# Patient Record
Sex: FEMALE
[De-identification: names, ages, dates, MRNs, and addresses within clinical notes are randomized per-mention and may not be internally consistent; named-entity substitution may affect disease eponyms.]

---

## 2024-05-02 ENCOUNTER — APPOINTMENT (OUTPATIENT)
Dept: BARIATRICS | Facility: CLINIC | Age: 44
End: 2024-05-02
Payer: MEDICAID

## 2024-05-02 VITALS
WEIGHT: 168 LBS | TEMPERATURE: 97.5 F | HEART RATE: 83 BPM | HEIGHT: 58 IN | BODY MASS INDEX: 35.26 KG/M2 | OXYGEN SATURATION: 97 % | SYSTOLIC BLOOD PRESSURE: 138 MMHG | DIASTOLIC BLOOD PRESSURE: 86 MMHG

## 2024-05-02 DIAGNOSIS — Z00.00 ENCOUNTER FOR GENERAL ADULT MEDICAL EXAMINATION W/OUT ABNORMAL FINDINGS: ICD-10-CM

## 2024-05-02 PROCEDURE — 99204 OFFICE O/P NEW MOD 45 MIN: CPT

## 2024-05-09 LAB
25(OH)D3 SERPL-MCNC: 24.5 NG/ML
A-TOCOPHEROL VIT E SERPL-MCNC: 11 MG/L
ALBUMIN SERPL ELPH-MCNC: 4.4 G/DL
ALP BLD-CCNC: 142 U/L
ALT SERPL-CCNC: 82 U/L
ANION GAP SERPL CALC-SCNC: 12 MMOL/L
APPEARANCE: CLEAR
AST SERPL-CCNC: 44 U/L
BACTERIA: ABNORMAL /HPF
BASOPHILS # BLD AUTO: 0.08 K/UL
BASOPHILS NFR BLD AUTO: 1.1 %
BETA+GAMMA TOCOPHEROL SERPL-MCNC: 1.6 MG/L
BILIRUB SERPL-MCNC: 0.6 MG/DL
BILIRUBIN URINE: NEGATIVE
BLOOD URINE: NEGATIVE
BUN SERPL-MCNC: 13 MG/DL
CA-I SERPL-SCNC: 5.1 MG/DL
CALCIUM SERPL-MCNC: 9.4 MG/DL
CALCIUM SERPL-MCNC: 9.4 MG/DL
CAST: 0 /LPF
CHLORIDE SERPL-SCNC: 103 MMOL/L
CHOLEST SERPL-MCNC: 240 MG/DL
CO2 SERPL-SCNC: 24 MMOL/L
COLOR: YELLOW
CREAT SERPL-MCNC: 0.65 MG/DL
EGFR: 113 ML/MIN/1.73M2
EOSINOPHIL # BLD AUTO: 0.39 K/UL
EOSINOPHIL NFR BLD AUTO: 5.5 %
EPITHELIAL CELLS: 5 /HPF
ESTIMATED AVERAGE GLUCOSE: 126 MG/DL
FOLATE SERPL-MCNC: 14 NG/ML
GLUCOSE QUALITATIVE U: NEGATIVE MG/DL
GLUCOSE SERPL-MCNC: 103 MG/DL
HBA1C MFR BLD HPLC: 6 %
HCG SERPL QL: NEGATIVE
HCT VFR BLD CALC: 40.9 %
HDLC SERPL-MCNC: 39 MG/DL
HGB BLD-MCNC: 13 G/DL
IMM GRANULOCYTES NFR BLD AUTO: 0.1 %
INR PPP: 0.93 RATIO
IRON SATN MFR SERPL: 23 %
IRON SERPL-MCNC: 101 UG/DL
KETONES URINE: NEGATIVE MG/DL
LDLC SERPL CALC-MCNC: 158 MG/DL
LEUKOCYTE ESTERASE URINE: NEGATIVE
LYMPHOCYTES # BLD AUTO: 1.7 K/UL
LYMPHOCYTES NFR BLD AUTO: 23.8 %
MAN DIFF?: NORMAL
MCHC RBC-ENTMCNC: 25.9 PG
MCHC RBC-ENTMCNC: 31.8 GM/DL
MCV RBC AUTO: 81.5 FL
MICROSCOPIC-UA: NORMAL
MONOCYTES # BLD AUTO: 0.42 K/UL
MONOCYTES NFR BLD AUTO: 5.9 %
NEUTROPHILS # BLD AUTO: 4.54 K/UL
NEUTROPHILS NFR BLD AUTO: 63.6 %
NITRITE URINE: NEGATIVE
NONHDLC SERPL-MCNC: 201 MG/DL
PAPP-A SERPL-ACNC: <1 MIU/ML
PARATHYROID HORMONE INTACT: 46 PG/ML
PH URINE: 6
PLATELET # BLD AUTO: 249 K/UL
POTASSIUM SERPL-SCNC: 4.5 MMOL/L
PREALB SERPL NEPH-MCNC: 25 MG/DL
PROT SERPL-MCNC: 6.9 G/DL
PROTEIN URINE: NEGATIVE MG/DL
PT BLD: 10.6 SEC
RBC # BLD: 5.02 M/UL
RBC # FLD: 15.8 %
RED BLOOD CELLS URINE: 0 /HPF
SODIUM SERPL-SCNC: 140 MMOL/L
SPECIFIC GRAVITY URINE: 1.02
TIBC SERPL-MCNC: 433 UG/DL
TRIGL SERPL-MCNC: 234 MG/DL
TSH SERPL-ACNC: 1.22 UIU/ML
UIBC SERPL-MCNC: 332 UG/DL
UREA BREATH TEST QL: NEGATIVE
UROBILINOGEN URINE: 0.2 MG/DL
VIT A SERPL-MCNC: 44.8 UG/DL
VIT B12 SERPL-MCNC: 199 PG/ML
WBC # FLD AUTO: 7.14 K/UL
WHITE BLOOD CELLS URINE: 1 /HPF
ZINC SERPL-MCNC: 86 UG/DL

## 2024-05-09 RX ORDER — ERGOCALCIFEROL 1.25 MG/1
1.25 MG CAPSULE, LIQUID FILLED ORAL
Qty: 8 | Refills: 0 | Status: ACTIVE | COMMUNITY
Start: 2024-05-09 | End: 1900-01-01

## 2024-05-09 RX ORDER — CYANOCOBALAMIN (VITAMIN B-12) 2500 MCG
2500 TABLET, SUBLINGUAL SUBLINGUAL DAILY
Qty: 30 | Refills: 1 | Status: ACTIVE | COMMUNITY
Start: 2024-05-09 | End: 1900-01-01

## 2024-05-10 ENCOUNTER — APPOINTMENT (OUTPATIENT)
Dept: BARIATRICS | Facility: CLINIC | Age: 44
End: 2024-05-10
Payer: MEDICAID

## 2024-05-10 VITALS — BODY MASS INDEX: 35.68 KG/M2 | HEIGHT: 58 IN | WEIGHT: 170 LBS

## 2024-05-10 PROCEDURE — 98968 PH1 ASSMT&MGMT NQHP 21-30: CPT

## 2024-05-13 LAB — VIT B1 SERPL-MCNC: 129.1 NMOL/L

## 2024-05-23 NOTE — END OF VISIT
[Time Spent: ___ minutes] : I have spent [unfilled] minutes of time on the encounter. [FreeTextEntry3] :  All medical record entries made by the Scribe were at my, CHENTE Rosales , direction and personally dictated by me on 05/02/2024 . I have reviewed the chart and agree that the record accurately reflects my personal performance of the history, physical exam, assessment and plan. I have also personally directed, reviewed, and agreed with the chart.

## 2024-05-23 NOTE — HISTORY OF PRESENT ILLNESS
[de-identified] : Pt is a 42 y/o F with PMHx of ARAGON and PSHx of partial lobectomy with sentinel lymph node dissection for breast CA who presents today after beginning bariatric workup in [facility] for an initial consultation for bariatric surgery. Patient states that she is done with breast CA treatment and is currently in remission. We discussed at length surgical and non-surgical options and that non-surgical approaches are unlikely to lead to long term, sustained weight loss. We also discussed that surgery alone is unlikely to be successful but should rather be seen as a tool for weight loss to be integrated with physical activity and nutritional counseling. All risks of surgery were explained to the patient including the risks of leaks, infection, blood clots, and death. The patient verbalized understanding and agreed to proceed with the evaluation. Will continue bariatric workup and move forward with possible VSG.

## 2024-05-23 NOTE — ASSESSMENT
[FreeTextEntry1] : Pt is a 42 y/o F with PMHx of ARAGON and PSHx of partial lobectomy with sentinel lymph node dissection for breast CA who presents today after beginning bariatric workup in [facility] for an initial consultation for bariatric surgery. Patient states that she is done with breast CA treatment and is currently in remission. We discussed at length surgical and non-surgical options and that non-surgical approaches are unlikely to lead to long term, sustained weight loss. We also discussed that surgery alone is unlikely to be successful but should rather be seen as a tool for weight loss to be integrated with physical activity and nutritional counseling. All risks of surgery were explained to the patient including the risks of leaks, infection, blood clots, and death. The patient verbalized understanding and agreed to proceed with the evaluation. Will continue bariatric workup and move forward with possible VSG.

## 2024-05-23 NOTE — ADDENDUM
[FreeTextEntry1] :  Documented by Garry Zamarripa acting as a scribe for CHENTE Li  on 05/02/2024  .

## 2024-05-23 NOTE — PLAN
[FreeTextEntry1] : Continue bariatric workup and move forward with possible VSG.  Normal rate, regular rhythm, normal S1, S2 heart sounds heard.

## 2024-06-10 ENCOUNTER — APPOINTMENT (OUTPATIENT)
Dept: BARIATRICS | Facility: CLINIC | Age: 44
End: 2024-06-10
Payer: MEDICAID

## 2024-06-10 VITALS — BODY MASS INDEX: 35.68 KG/M2 | HEIGHT: 58 IN | WEIGHT: 170 LBS

## 2024-06-10 PROCEDURE — 98967 PH1 ASSMT&MGMT NQHP 11-20: CPT

## 2024-06-17 ENCOUNTER — APPOINTMENT (OUTPATIENT)
Dept: PULMONOLOGY | Facility: CLINIC | Age: 44
End: 2024-06-17
Payer: MEDICAID

## 2024-06-17 ENCOUNTER — APPOINTMENT (OUTPATIENT)
Dept: PULMONOLOGY | Facility: CLINIC | Age: 44
End: 2024-06-17

## 2024-06-17 VITALS
TEMPERATURE: 98.7 F | DIASTOLIC BLOOD PRESSURE: 86 MMHG | SYSTOLIC BLOOD PRESSURE: 132 MMHG | HEIGHT: 58 IN | WEIGHT: 171.13 LBS | RESPIRATION RATE: 18 BRPM | BODY MASS INDEX: 35.92 KG/M2 | HEART RATE: 88 BPM

## 2024-06-17 DIAGNOSIS — E78.00 PURE HYPERCHOLESTEROLEMIA, UNSPECIFIED: ICD-10-CM

## 2024-06-17 DIAGNOSIS — Z83.49 FAMILY HISTORY OF OTHER ENDOCRINE, NUTRITIONAL AND METABOLIC DISEASES: ICD-10-CM

## 2024-06-17 DIAGNOSIS — E66.01 MORBID (SEVERE) OBESITY DUE TO EXCESS CALORIES: ICD-10-CM

## 2024-06-17 DIAGNOSIS — C50.919 MALIGNANT NEOPLASM OF UNSPECIFIED SITE OF UNSPECIFIED FEMALE BREAST: ICD-10-CM

## 2024-06-17 DIAGNOSIS — Z83.3 FAMILY HISTORY OF DIABETES MELLITUS: ICD-10-CM

## 2024-06-17 DIAGNOSIS — Z82.5 FAMILY HISTORY OF ASTHMA AND OTHER CHRONIC LOWER RESPIRATORY DISEASES: ICD-10-CM

## 2024-06-17 DIAGNOSIS — Z01.811 ENCOUNTER FOR PREPROCEDURAL RESPIRATORY EXAMINATION: ICD-10-CM

## 2024-06-17 DIAGNOSIS — Z78.9 OTHER SPECIFIED HEALTH STATUS: ICD-10-CM

## 2024-06-17 DIAGNOSIS — K76.0 FATTY (CHANGE OF) LIVER, NOT ELSEWHERE CLASSIFIED: ICD-10-CM

## 2024-06-17 DIAGNOSIS — Z82.49 FAMILY HISTORY OF ISCHEMIC HEART DISEASE AND OTHER DISEASES OF THE CIRCULATORY SYSTEM: ICD-10-CM

## 2024-06-17 PROCEDURE — 99203 OFFICE O/P NEW LOW 30 MIN: CPT

## 2024-06-17 RX ORDER — PHENOL 1.4 %
AEROSOL, SPRAY (ML) MUCOUS MEMBRANE
Refills: 0 | Status: DISCONTINUED | COMMUNITY

## 2024-06-17 RX ORDER — ROSUVASTATIN CALCIUM 20 MG/1
20 TABLET, FILM COATED ORAL DAILY
Refills: 0 | Status: ACTIVE | COMMUNITY

## 2024-06-17 RX ORDER — LETROZOLE TABLETS 2.5 MG/1
2.5 TABLET, FILM COATED ORAL DAILY
Refills: 0 | Status: ACTIVE | COMMUNITY

## 2024-06-20 NOTE — ASSESSMENT
[FreeTextEntry1] : Patient is a 43-year-old female with a medical history of Breast CA, HLD, and morbid obesity who presents for preoperative pulmonary evaluation in anticipation of undergoing bariatric surgery. The surgical date for a possible vertical sleeve gastrectomy (VSG) has yet to be finalized. Currently without any sinopulmonary complaint and denies any history of pulmonary disease. No pulmonary contradiction for bariatric surgery has been identified. Cleared from a pulmonary perspective for surgery.   # Preop pulmonary/respiratory exam - Pt denies any past or present history of pulmonary symptoms - STOP-BANG Total Score: 1 (Low risk of BHARATH Yes 0-2). - [place] Sleepiness Score: 3 (0-7: It is unlikely that you are abnormally sleepy.).   - Mallampati Class: I - Pulmonary clearance for bariatric surgery provided

## 2024-06-20 NOTE — HISTORY OF PRESENT ILLNESS
[Never] : never [TextBox_4] : Patient is a 43-year-old female with a medical history of Breast CA, HLD, and morbid obesity presenting for an initial visit following a referral from AUSTIN Alanis for pulmonary clearance in anticipation of bariatric surgery. The surgical date for a possible vertical sleeve gastrectomy (VSG) has yet to be finalized.  The patient denies having any history of pulmonary disease such as asthma nor does she complain of any present pulmonary symptoms. The presence of shortness of breath, wheezing, cough, and chest tightness was denied. It was mentioned the only time in which she may experience shortness of breath if moving at an extremely fast pace. She is able to walk rough 8 city blocks at an even pace without experiencing any shortness of breath. She denies any recent history of pulmonary infection and reports having contracted COVID during the initial phase of the pandemic during which time no pulmonary symptoms arose.   It was mentioned she has a previous history of having surgery requiring anesthesia approximately one year ago related to breast CA. There were no reported pulmonary complications at that time.   Dentition: Missing tooth: YES  Loose teeth: NO Dentures: NO     STOP-BANG Sleep Apnea Questionnaire Do you SNORE loudly (louder than talking or loud enough to be heard through closed doors)? NO Do you often feel TIRED, fatigued, or sleepy during daytime? NO Has anyone OBSERVED you stop breathing during your sleep? NO Do you have or are you being treated for high blood PRESSURE? NO BMI more than 35kg/m2? YES  AGE over 50 years old? NO NECK circumference > 16 inches (40cm)? NO GENDER: Male? NO                    Total Score: 1 - Low risk of BHARATH Yes 0-2      [place] Sleepiness Scale How likely are you to doze off or fall asleep in the following situations? 0-Never, 1-Slight, 2-Moderate, 3-High Sitting and reading ___2__ Watching TV __1___ Sitting, inactive in public place __0___ As a passenger in a car for an hour without a break _0___ Lying down to rest in the afternoon __0___ Sitting and talking to someone ___0__ Sitting quietly after lunch without alcohol __0___ In a car stopped for a few minutes in traffic __0___                     Score: 3 (0-7: It is unlikely that you are abnormally sleepy.)  [TextBox_29] : Denies history of alcohol consumption

## 2024-06-20 NOTE — PHYSICAL EXAM
[No Acute Distress] : no acute distress [Normal Oropharynx] : normal oropharynx [I] : Mallampati Class: I [2+] : Right Tonsil: 2+ [Normal Appearance] : normal appearance [No Neck Mass] : no neck mass [Normal Rate/Rhythm] : normal rate/rhythm [Normal S1, S2] : normal s1, s2 [No Resp Distress] : no resp distress [Clear to Auscultation Bilaterally] : clear to auscultation bilaterally [Normal Gait] : normal gait [No Clubbing] : no clubbing [No Cyanosis] : no cyanosis [No Edema] : no edema [Normal Color/ Pigmentation] : normal color/ pigmentation [No Focal Deficits] : no focal deficits [Oriented x3] : oriented x3 [Normal Affect] : normal affect [TextBox_80] : clear to auscultation bilaterally

## 2024-06-20 NOTE — REASON FOR VISIT
[TextBox_44] : Pre-Op pulmonary clearance [TextBox_13] : AUSTIN Alanis [TextEntry] : Visit completed in collaboration with CHULA Mendoza  ID #: [id number] (Macedonian)

## 2024-07-09 VITALS — HEIGHT: 58 IN | BODY MASS INDEX: 36.73 KG/M2 | WEIGHT: 175 LBS

## 2024-08-06 ENCOUNTER — APPOINTMENT (OUTPATIENT)
Dept: RADIOLOGY | Facility: CLINIC | Age: 44
End: 2024-08-06

## 2024-09-24 ENCOUNTER — OUTPATIENT (OUTPATIENT)
Dept: OUTPATIENT SERVICES | Facility: HOSPITAL | Age: 44
LOS: 1 days | End: 2024-09-24

## 2024-09-24 ENCOUNTER — APPOINTMENT (OUTPATIENT)
Dept: RADIOLOGY | Facility: HOSPITAL | Age: 44
End: 2024-09-24
Payer: COMMERCIAL

## 2024-09-24 PROCEDURE — 74240 X-RAY XM UPR GI TRC 1CNTRST: CPT

## 2024-09-24 PROCEDURE — 74240 X-RAY XM UPR GI TRC 1CNTRST: CPT | Mod: 26

## 2024-09-27 ENCOUNTER — OUTPATIENT (OUTPATIENT)
Dept: OUTPATIENT SERVICES | Facility: HOSPITAL | Age: 44
LOS: 1 days | End: 2024-09-27
Payer: COMMERCIAL

## 2024-09-27 ENCOUNTER — RESULT REVIEW (OUTPATIENT)
Age: 44
End: 2024-09-27

## 2024-09-27 ENCOUNTER — APPOINTMENT (OUTPATIENT)
Dept: BARIATRICS | Facility: CLINIC | Age: 44
End: 2024-09-27
Payer: COMMERCIAL

## 2024-09-27 VITALS
TEMPERATURE: 97.9 F | WEIGHT: 171 LBS | OXYGEN SATURATION: 99 % | BODY MASS INDEX: 35.89 KG/M2 | HEART RATE: 109 BPM | DIASTOLIC BLOOD PRESSURE: 84 MMHG | SYSTOLIC BLOOD PRESSURE: 134 MMHG | HEIGHT: 58 IN

## 2024-09-27 PROCEDURE — 71046 X-RAY EXAM CHEST 2 VIEWS: CPT | Mod: 26

## 2024-09-27 PROCEDURE — 99214 OFFICE O/P EST MOD 30 MIN: CPT

## 2024-09-27 PROCEDURE — 71046 X-RAY EXAM CHEST 2 VIEWS: CPT

## 2024-09-27 NOTE — ASSESSMENT
[FreeTextEntry1] : Pt is a 44 y/o F with PMHx of ARAGON, hypercholesterolemia, GERD, and PSHx of partial lobectomy with sentinel lymph node dissection for breast CA who presents today for a possible final visit prior to bariatric surgery. Patient states that she is in remission for breast CA and is currently on letrozole.  We educated her on the importance of lifestyle changes after surgery including a healthy diet with high fiber/protein diet and regular exercise. We also discussed that surgery alone is unlikely to be successful but should rather be seen as a tool for weight loss to be integrated with physical activity and nutritional counseling. Given that the patient is on letrozole, believe the best surgical option is sleeve gastrectomy. Additionally, pre- op and post-op instructions were given. The procedure was discussed in great detail including the risks of surgery such as leaks, blood clots, and death. All questions were answered, and the patient has no other concerns at this time. No contraindication to proceed. The patient will be scheduled for VSG.

## 2024-09-27 NOTE — ADDENDUM
[FreeTextEntry1] :  Documented by Garry Zamarripa acting as a scribe for CHENTE Li  on 09/27/2024  .

## 2024-09-27 NOTE — END OF VISIT
[FreeTextEntry3] :  All medical record entries made by the Scribe were at my, CHENTE Rosales , direction and personally dictated by me on 09/27/2024 . I have reviewed the chart and agree that the record accurately reflects my personal performance of the history, physical exam, assessment and plan. I have also personally directed, reviewed, and agreed with the chart.  [Time Spent: ___ minutes] : I have spent [unfilled] minutes of time on the encounter which excludes teaching and separately reported services.

## 2024-09-27 NOTE — HISTORY OF PRESENT ILLNESS
[de-identified] : Pt is a 44 y/o F with PMHx of ARAGON, hypercholesterolemia, GERD, and PSHx of partial lobectomy with sentinel lymph node dissection for breast CA who presents today for a possible final visit prior to bariatric surgery. Patient states that she is in remission for breast CA and is currently on letrozole.  We educated her on the importance of lifestyle changes after surgery including a healthy diet with high fiber/protein diet and regular exercise. We also discussed that surgery alone is unlikely to be successful but should rather be seen as a tool for weight loss to be integrated with physical activity and nutritional counseling. Given that the patient is on letrozole, believe the best surgical option is sleeve gastrectomy. Additionally, pre- op and post-op instructions were given. The procedure was discussed in great detail including the risks of surgery such as leaks, blood clots, and death. All questions were answered, and the patient has no other concerns at this time. No contraindication to proceed. The patient will be scheduled for VSG.

## 2024-10-03 ENCOUNTER — NON-APPOINTMENT (OUTPATIENT)
Age: 44
End: 2024-10-03

## 2024-10-08 VITALS
TEMPERATURE: 97 F | DIASTOLIC BLOOD PRESSURE: 81 MMHG | SYSTOLIC BLOOD PRESSURE: 122 MMHG | WEIGHT: 170.42 LBS | HEIGHT: 59 IN | RESPIRATION RATE: 16 BRPM | HEART RATE: 74 BPM | OXYGEN SATURATION: 98 %

## 2024-10-08 RX ORDER — ROSUVASTATIN CALCIUM 20 MG/1
1 TABLET, COATED ORAL
Refills: 0 | DISCHARGE

## 2024-10-09 ENCOUNTER — INPATIENT (INPATIENT)
Facility: HOSPITAL | Age: 44
LOS: 0 days | Discharge: HOME | DRG: 621 | End: 2024-10-10
Attending: GENERAL ACUTE CARE HOSPITAL | Admitting: GENERAL ACUTE CARE HOSPITAL
Payer: COMMERCIAL

## 2024-10-09 ENCOUNTER — APPOINTMENT (OUTPATIENT)
Dept: BARIATRICS | Facility: HOSPITAL | Age: 44
End: 2024-10-09

## 2024-10-09 ENCOUNTER — RESULT REVIEW (OUTPATIENT)
Age: 44
End: 2024-10-09

## 2024-10-09 DIAGNOSIS — Z98.891 HISTORY OF UTERINE SCAR FROM PREVIOUS SURGERY: Chronic | ICD-10-CM

## 2024-10-09 DIAGNOSIS — Z98.51 TUBAL LIGATION STATUS: Chronic | ICD-10-CM

## 2024-10-09 DIAGNOSIS — Z98.890 OTHER SPECIFIED POSTPROCEDURAL STATES: Chronic | ICD-10-CM

## 2024-10-09 LAB
BLD GP AB SCN SERPL QL: NEGATIVE — SIGNIFICANT CHANGE UP
GLUCOSE BLDC GLUCOMTR-MCNC: 103 MG/DL — HIGH (ref 70–99)
GLUCOSE BLDC GLUCOMTR-MCNC: 86 MG/DL — SIGNIFICANT CHANGE UP (ref 70–99)
HCT VFR BLD CALC: 40 % — SIGNIFICANT CHANGE UP (ref 34.5–45)
HGB BLD-MCNC: 12.7 G/DL — SIGNIFICANT CHANGE UP (ref 11.5–15.5)
MCHC RBC-ENTMCNC: 26.7 PG — LOW (ref 27–34)
MCHC RBC-ENTMCNC: 31.8 GM/DL — LOW (ref 32–36)
MCV RBC AUTO: 84 FL — SIGNIFICANT CHANGE UP (ref 80–100)
NRBC # BLD: 0 /100 WBCS — SIGNIFICANT CHANGE UP (ref 0–0)
PLATELET # BLD AUTO: 257 K/UL — SIGNIFICANT CHANGE UP (ref 150–400)
RBC # BLD: 4.76 M/UL — SIGNIFICANT CHANGE UP (ref 3.8–5.2)
RBC # FLD: 15.5 % — HIGH (ref 10.3–14.5)
RH IG SCN BLD-IMP: POSITIVE — SIGNIFICANT CHANGE UP
WBC # BLD: 11.94 K/UL — HIGH (ref 3.8–10.5)
WBC # FLD AUTO: 11.94 K/UL — HIGH (ref 3.8–10.5)

## 2024-10-09 PROCEDURE — S2900 ROBOTIC SURGICAL SYSTEM: CPT | Mod: NC

## 2024-10-09 PROCEDURE — 43775 LAP SLEEVE GASTRECTOMY: CPT

## 2024-10-09 PROCEDURE — 88307 TISSUE EXAM BY PATHOLOGIST: CPT | Mod: 26

## 2024-10-09 DEVICE — XI STAPLER SUREFORM RELOAD 60 BLUE: Type: IMPLANTABLE DEVICE | Status: FUNCTIONAL

## 2024-10-09 DEVICE — MESH PHASIX ST 7X10CM: Type: IMPLANTABLE DEVICE | Status: FUNCTIONAL

## 2024-10-09 DEVICE — XI STAPLER SUREFORM RELOAD 60 GREEN: Type: IMPLANTABLE DEVICE | Status: FUNCTIONAL

## 2024-10-09 RX ORDER — APREPITANT 125MG-80MG
80 KIT ORAL ONCE
Refills: 0 | Status: COMPLETED | OUTPATIENT
Start: 2024-10-09 | End: 2024-10-09

## 2024-10-09 RX ORDER — THIAMINE HYDROCHLORIDE 100 MG/ML
500 INJECTION, SOLUTION INTRAMUSCULAR; INTRAVENOUS EVERY 24 HOURS
Refills: 0 | Status: DISCONTINUED | OUTPATIENT
Start: 2024-10-09 | End: 2024-10-10

## 2024-10-09 RX ORDER — SCOPOLAMINE 1 MG/3D
1 PATCH, EXTENDED RELEASE TRANSDERMAL ONCE
Refills: 0 | Status: COMPLETED | OUTPATIENT
Start: 2024-10-09 | End: 2024-10-09

## 2024-10-09 RX ORDER — OXYCODONE HYDROCHLORIDE 30 MG/1
2.5 TABLET, FILM COATED, EXTENDED RELEASE ORAL EVERY 6 HOURS
Refills: 0 | Status: DISCONTINUED | OUTPATIENT
Start: 2024-10-09 | End: 2024-10-10

## 2024-10-09 RX ORDER — SODIUM CHLORIDE IRRIG SOLUTION 0.9 %
1000 SOLUTION, IRRIGATION IRRIGATION
Refills: 0 | Status: DISCONTINUED | OUTPATIENT
Start: 2024-10-09 | End: 2024-10-10

## 2024-10-09 RX ORDER — KETOROLAC TROMETHAMINE 10 MG/1
15 TABLET, FILM COATED ORAL EVERY 6 HOURS
Refills: 0 | Status: DISCONTINUED | OUTPATIENT
Start: 2024-10-09 | End: 2024-10-10

## 2024-10-09 RX ORDER — OXYCODONE HYDROCHLORIDE 30 MG/1
5 TABLET, FILM COATED, EXTENDED RELEASE ORAL EVERY 6 HOURS
Refills: 0 | Status: DISCONTINUED | OUTPATIENT
Start: 2024-10-09 | End: 2024-10-10

## 2024-10-09 RX ORDER — PANTOPRAZOLE SODIUM 40 MG/1
40 TABLET, DELAYED RELEASE ORAL DAILY
Refills: 0 | Status: DISCONTINUED | OUTPATIENT
Start: 2024-10-09 | End: 2024-10-10

## 2024-10-09 RX ORDER — ENOXAPARIN SODIUM 150 MG/ML
40 INJECTION SUBCUTANEOUS ONCE
Refills: 0 | Status: COMPLETED | OUTPATIENT
Start: 2024-10-09 | End: 2024-10-09

## 2024-10-09 RX ORDER — LETROZOLE TABLETS 2.5 MG/1
1 TABLET, FILM COATED ORAL
Refills: 0 | DISCHARGE

## 2024-10-09 RX ORDER — ACETAMINOPHEN 325 MG
1000 TABLET ORAL ONCE
Refills: 0 | Status: COMPLETED | OUTPATIENT
Start: 2024-10-09 | End: 2024-10-09

## 2024-10-09 RX ORDER — ACETAMINOPHEN 325 MG
1000 TABLET ORAL EVERY 6 HOURS
Refills: 0 | Status: DISCONTINUED | OUTPATIENT
Start: 2024-10-09 | End: 2024-10-10

## 2024-10-09 RX ORDER — HYDROMORPHONE HYDROCHLORIDE 1 MG/ML
0.5 INJECTION, SOLUTION INTRAMUSCULAR; INTRAVENOUS; SUBCUTANEOUS
Refills: 0 | Status: DISCONTINUED | OUTPATIENT
Start: 2024-10-09 | End: 2024-10-10

## 2024-10-09 RX ORDER — ONDANSETRON HCL/PF 4 MG/2 ML
4 VIAL (ML) INJECTION EVERY 6 HOURS
Refills: 0 | Status: DISCONTINUED | OUTPATIENT
Start: 2024-10-09 | End: 2024-10-10

## 2024-10-09 RX ADMIN — OXYCODONE HYDROCHLORIDE 5 MILLIGRAM(S): 30 TABLET, FILM COATED, EXTENDED RELEASE ORAL at 15:30

## 2024-10-09 RX ADMIN — Medication 1000 MILLIGRAM(S): at 19:08

## 2024-10-09 RX ADMIN — THIAMINE HYDROCHLORIDE 105 MILLIGRAM(S): 100 INJECTION, SOLUTION INTRAMUSCULAR; INTRAVENOUS at 22:46

## 2024-10-09 RX ADMIN — Medication 140 MILLILITER(S): at 14:05

## 2024-10-09 RX ADMIN — PANTOPRAZOLE SODIUM 40 MILLIGRAM(S): 40 TABLET, DELAYED RELEASE ORAL at 16:56

## 2024-10-09 RX ADMIN — SCOPOLAMINE 1 PATCH: 1 PATCH, EXTENDED RELEASE TRANSDERMAL at 10:38

## 2024-10-09 RX ADMIN — ENOXAPARIN SODIUM 40 MILLIGRAM(S): 150 INJECTION SUBCUTANEOUS at 10:38

## 2024-10-09 RX ADMIN — APREPITANT 80 MILLIGRAM(S): KIT at 10:39

## 2024-10-09 RX ADMIN — Medication 4 MILLIGRAM(S): at 22:55

## 2024-10-09 RX ADMIN — Medication 220 MILLIGRAM(S): at 22:12

## 2024-10-09 RX ADMIN — SCOPOLAMINE 1 PATCH: 1 PATCH, EXTENDED RELEASE TRANSDERMAL at 19:37

## 2024-10-09 RX ADMIN — KETOROLAC TROMETHAMINE 15 MILLIGRAM(S): 10 TABLET, FILM COATED ORAL at 22:12

## 2024-10-09 RX ADMIN — Medication 1000 MILLIGRAM(S): at 10:38

## 2024-10-09 NOTE — H&P ADULT - ASSESSMENT
42 y/o F with PMHx of pre-DM, ARAGON, hypercholesterolemia, GERD, and PSHx of partial lumpectomy with sentinel lymph node dissection of left breast for breast CA who presents today for VSG.     Proceed to OR  Admit to Dr. Gaxiola Postop 44 y/o F with PMHx of pre-DM, ARAGON, hypercholesterolemia, GERD, and PSHx of partial lumpectomy with sentinel lymph node dissection of left breast for breast CA, csection and tubal ligation who presents today for VSG.     Proceed to OR  Admit to Dr. Gaxiola Postop

## 2024-10-09 NOTE — H&P ADULT - HISTORY OF PRESENT ILLNESS
42 y/o F with PMHx of pre-DM, ARAGON, hypercholesterolemia, GERD, and PSHx of partial lumpectomy with sentinel lymph node dissection of left breast for breast CA who presents today for VSG.   MICAH aldana    home meds: letrozole 2.4 daily 42 y/o F with PMHx of pre-DM, ARAGON, hypercholesterolemia, GERD, and PSHx of partial lumpectomy with sentinel lymph node dissection of left breast for breast CA, csection, and tubal ligation who presents today for VSG.   MICAH wnl    home meds: letrozole 2.4 daily

## 2024-10-09 NOTE — BRIEF OPERATIVE NOTE - OPERATION/FINDINGS
Procedure: Robotic assisted lap sleeve gastrectomy  Indication: BMI 35, Morbid obesity    Patient prepped and draped in sterile fashion. Insufflation with Veress needle in LUQ. 8mm port placed in RLQ 10cm from umbilicus. 12mm port placed above umbilicus approx 22cm down from the xiphoid process. Additional 8mm ports spaced 10cm apart to the left of the umbilical incision. Liver retractor placed via 5mm port in R lateral abdomen. Patient placed in steep reverse Trendelenburg. Robot docked. No hiatal hernia identified. Using vessel sealer, gastrocolic ligament was ligated free from the greater curvature of the stomach from approx 3cm from the pylorus to the angle of His. Posterior gastric wall attachments released via vessel sealer as well. Using multiple 60mm Blue load stapler fires, the greater curvature of the stomach was stapled and divided. Stapler line oversewed with 2-0 Stratafix suture in running fashion. Specimen extracted. Umbilical incision closed with 0-0 Vicryl on a chadd villanueva suture passer in figure of 8 fashion x2. Abdomen desufflated and skin closed with Monocryl.

## 2024-10-09 NOTE — H&P ADULT - NSICDXPASTSURGICALHX_GEN_ALL_CORE_FT
PAST SURGICAL HISTORY:  H/O  section     H/O tubal ligation     History of lumpectomy of left breast

## 2024-10-09 NOTE — PRE-ANESTHESIA EVALUATION ADULT - NSANTHOSAYNRD_GEN_A_CORE
No. BHARATH screening performed.  STOP BANG Legend: 0-2 = LOW Risk; 3-4 = INTERMEDIATE Risk; 5-8 = HIGH Risk

## 2024-10-09 NOTE — CHART NOTE - NSCHARTNOTEFT_GEN_A_CORE
General Surgery Post op Check    Pt seen and examined without complaints. Pain is controlled. Reports a mild headache that gets better with tylenol. Reports some nausea but no vomiting. Denies SOB and chest pain.    Vital Signs Last 24 Hrs  T(C): 36.8 (09 Oct 2024 16:38), Max: 36.9 (09 Oct 2024 13:52)  T(F): 98.3 (09 Oct 2024 16:38), Max: 98.5 (09 Oct 2024 13:52)  HR: 93 (09 Oct 2024 16:38) (74 - 93)  BP: 135/90 (09 Oct 2024 16:38) (122/81 - 150/80)  BP(mean): 106 (09 Oct 2024 15:07) (99 - 110)  RR: 18 (09 Oct 2024 16:38) (16 - 20)  SpO2: 95% (09 Oct 2024 16:38) (95% - 100%)    Parameters below as of 09 Oct 2024 16:38  Patient On (Oxygen Delivery Method): nasal cannula        I&O's Summary    09 Oct 2024 07:01  -  09 Oct 2024 16:46  --------------------------------------------------------  IN: 140 mL / OUT: 150 mL / NET: -10 mL        Physical Exam  Gen: NAD, A&Ox3  Pulm: No respiratory distress, no subcostal retractions  CV: RRR  Abd: Soft, NT, ND, incisions c/d/i   Extremities:  FROM, warm and well perfused    A/P: 43y Female s/p p RA lap VSG (10/9)    BCLD/IVF  pain/nausea control  thiamine   protonix  OOBA/SCD/IS  Lovenox POD 1  AM labs  Nutrition consult

## 2024-10-10 ENCOUNTER — TRANSCRIPTION ENCOUNTER (OUTPATIENT)
Age: 44
End: 2024-10-10

## 2024-10-10 VITALS
DIASTOLIC BLOOD PRESSURE: 88 MMHG | TEMPERATURE: 98 F | RESPIRATION RATE: 18 BRPM | SYSTOLIC BLOOD PRESSURE: 138 MMHG | HEART RATE: 73 BPM | OXYGEN SATURATION: 96 %

## 2024-10-10 LAB
ANION GAP SERPL CALC-SCNC: 8 MMOL/L — SIGNIFICANT CHANGE UP (ref 5–17)
BASOPHILS # BLD AUTO: 0.01 K/UL — SIGNIFICANT CHANGE UP (ref 0–0.2)
BASOPHILS NFR BLD AUTO: 0.2 % — SIGNIFICANT CHANGE UP (ref 0–2)
BUN SERPL-MCNC: 11 MG/DL — SIGNIFICANT CHANGE UP (ref 7–23)
CALCIUM SERPL-MCNC: 9.1 MG/DL — SIGNIFICANT CHANGE UP (ref 8.4–10.5)
CHLORIDE SERPL-SCNC: 104 MMOL/L — SIGNIFICANT CHANGE UP (ref 96–108)
CO2 SERPL-SCNC: 25 MMOL/L — SIGNIFICANT CHANGE UP (ref 22–31)
CREAT SERPL-MCNC: 0.8 MG/DL — SIGNIFICANT CHANGE UP (ref 0.5–1.3)
EGFR: 94 ML/MIN/1.73M2 — SIGNIFICANT CHANGE UP
EOSINOPHIL # BLD AUTO: 0 K/UL — SIGNIFICANT CHANGE UP (ref 0–0.5)
EOSINOPHIL NFR BLD AUTO: 0 % — SIGNIFICANT CHANGE UP (ref 0–6)
GLUCOSE SERPL-MCNC: 133 MG/DL — HIGH (ref 70–99)
HCT VFR BLD CALC: 36.1 % — SIGNIFICANT CHANGE UP (ref 34.5–45)
HGB BLD-MCNC: 11.2 G/DL — LOW (ref 11.5–15.5)
IMM GRANULOCYTES NFR BLD AUTO: 0.5 % — SIGNIFICANT CHANGE UP (ref 0–0.9)
LYMPHOCYTES # BLD AUTO: 0.74 K/UL — LOW (ref 1–3.3)
LYMPHOCYTES # BLD AUTO: 17.3 % — SIGNIFICANT CHANGE UP (ref 13–44)
MAGNESIUM SERPL-MCNC: 2 MG/DL — SIGNIFICANT CHANGE UP (ref 1.6–2.6)
MCHC RBC-ENTMCNC: 25.5 PG — LOW (ref 27–34)
MCHC RBC-ENTMCNC: 31 GM/DL — LOW (ref 32–36)
MCV RBC AUTO: 82.2 FL — SIGNIFICANT CHANGE UP (ref 80–100)
MONOCYTES # BLD AUTO: 0.35 K/UL — SIGNIFICANT CHANGE UP (ref 0–0.9)
MONOCYTES NFR BLD AUTO: 8.2 % — SIGNIFICANT CHANGE UP (ref 2–14)
NEUTROPHILS # BLD AUTO: 3.16 K/UL — SIGNIFICANT CHANGE UP (ref 1.8–7.4)
NEUTROPHILS NFR BLD AUTO: 73.8 % — SIGNIFICANT CHANGE UP (ref 43–77)
NRBC # BLD: 0 /100 WBCS — SIGNIFICANT CHANGE UP (ref 0–0)
PHOSPHATE SERPL-MCNC: 3.4 MG/DL — SIGNIFICANT CHANGE UP (ref 2.5–4.5)
PLATELET # BLD AUTO: 275 K/UL — SIGNIFICANT CHANGE UP (ref 150–400)
POTASSIUM SERPL-MCNC: 4.7 MMOL/L — SIGNIFICANT CHANGE UP (ref 3.5–5.3)
POTASSIUM SERPL-SCNC: 4.7 MMOL/L — SIGNIFICANT CHANGE UP (ref 3.5–5.3)
RBC # BLD: 4.39 M/UL — SIGNIFICANT CHANGE UP (ref 3.8–5.2)
RBC # FLD: 15.6 % — HIGH (ref 10.3–14.5)
SODIUM SERPL-SCNC: 137 MMOL/L — SIGNIFICANT CHANGE UP (ref 135–145)
WBC # BLD: 4.28 K/UL — SIGNIFICANT CHANGE UP (ref 3.8–10.5)
WBC # FLD AUTO: 4.28 K/UL — SIGNIFICANT CHANGE UP (ref 3.8–10.5)

## 2024-10-10 PROCEDURE — C1889: CPT

## 2024-10-10 PROCEDURE — 86900 BLOOD TYPING SEROLOGIC ABO: CPT

## 2024-10-10 PROCEDURE — 86850 RBC ANTIBODY SCREEN: CPT

## 2024-10-10 PROCEDURE — 82962 GLUCOSE BLOOD TEST: CPT

## 2024-10-10 PROCEDURE — 86901 BLOOD TYPING SEROLOGIC RH(D): CPT

## 2024-10-10 PROCEDURE — 85027 COMPLETE CBC AUTOMATED: CPT

## 2024-10-10 PROCEDURE — 85025 COMPLETE CBC W/AUTO DIFF WBC: CPT

## 2024-10-10 PROCEDURE — 84100 ASSAY OF PHOSPHORUS: CPT

## 2024-10-10 PROCEDURE — 80048 BASIC METABOLIC PNL TOTAL CA: CPT

## 2024-10-10 PROCEDURE — 83735 ASSAY OF MAGNESIUM: CPT

## 2024-10-10 PROCEDURE — C9399: CPT

## 2024-10-10 PROCEDURE — S2900: CPT

## 2024-10-10 PROCEDURE — 88307 TISSUE EXAM BY PATHOLOGIST: CPT

## 2024-10-10 PROCEDURE — 36415 COLL VENOUS BLD VENIPUNCTURE: CPT

## 2024-10-10 RX ORDER — ACETAMINOPHEN 325 MG
20 TABLET ORAL
Qty: 320 | Refills: 0
Start: 2024-10-10 | End: 2024-10-13

## 2024-10-10 RX ORDER — LETROZOLE TABLETS 2.5 MG/1
2.5 TABLET, FILM COATED ORAL ONCE
Refills: 0 | Status: COMPLETED | OUTPATIENT
Start: 2024-10-10 | End: 2024-10-10

## 2024-10-10 RX ORDER — ENOXAPARIN SODIUM 150 MG/ML
40 INJECTION SUBCUTANEOUS EVERY 24 HOURS
Refills: 0 | Status: DISCONTINUED | OUTPATIENT
Start: 2024-10-10 | End: 2024-10-10

## 2024-10-10 RX ORDER — ENOXAPARIN SODIUM 150 MG/ML
40 INJECTION SUBCUTANEOUS EVERY 12 HOURS
Refills: 0 | Status: DISCONTINUED | OUTPATIENT
Start: 2024-10-10 | End: 2024-10-10

## 2024-10-10 RX ORDER — ONDANSETRON HCL/PF 4 MG/2 ML
1 VIAL (ML) INJECTION
Qty: 9 | Refills: 0
Start: 2024-10-10 | End: 2024-10-12

## 2024-10-10 RX ORDER — SODIUM CHLORIDE IRRIG SOLUTION 0.9 %
1000 SOLUTION, IRRIGATION IRRIGATION
Refills: 0 | Status: DISCONTINUED | OUTPATIENT
Start: 2024-10-10 | End: 2024-10-10

## 2024-10-10 RX ORDER — HYOSCYAMINE SULFATE 0.125 MG
1 TABLET ORAL
Qty: 28 | Refills: 0
Start: 2024-10-10 | End: 2024-10-16

## 2024-10-10 RX ORDER — APIXABAN 5 MG/1
1 TABLET, FILM COATED ORAL
Qty: 60 | Refills: 0
Start: 2024-10-10 | End: 2024-11-08

## 2024-10-10 RX ADMIN — SCOPOLAMINE 1 PATCH: 1 PATCH, EXTENDED RELEASE TRANSDERMAL at 07:06

## 2024-10-10 RX ADMIN — Medication 1000 MILLIGRAM(S): at 00:24

## 2024-10-10 RX ADMIN — ENOXAPARIN SODIUM 40 MILLIGRAM(S): 150 INJECTION SUBCUTANEOUS at 09:53

## 2024-10-10 RX ADMIN — Medication 140 MILLILITER(S): at 06:03

## 2024-10-10 RX ADMIN — LETROZOLE TABLETS 2.5 MILLIGRAM(S): 2.5 TABLET, FILM COATED ORAL at 14:49

## 2024-10-10 RX ADMIN — KETOROLAC TROMETHAMINE 15 MILLIGRAM(S): 10 TABLET, FILM COATED ORAL at 03:00

## 2024-10-10 RX ADMIN — Medication 1000 MILLIGRAM(S): at 05:56

## 2024-10-10 RX ADMIN — KETOROLAC TROMETHAMINE 15 MILLIGRAM(S): 10 TABLET, FILM COATED ORAL at 09:54

## 2024-10-10 RX ADMIN — Medication 75 MILLILITER(S): at 09:53

## 2024-10-10 RX ADMIN — KETOROLAC TROMETHAMINE 15 MILLIGRAM(S): 10 TABLET, FILM COATED ORAL at 15:34

## 2024-10-10 NOTE — DIETITIAN INITIAL EVALUATION ADULT - ETIOLOGY
related to need for educational review on the diet advancement process and specific nutrient needs s/p VSG

## 2024-10-10 NOTE — DIETITIAN INITIAL EVALUATION ADULT - ENTER TO (G/KG)
Kimberly Palomares is a 81 year old female presenting for a follow-up of 6 months follow up .   Rates pain  2-3/10 today dull pain when sitting    Procedure Follow Up: No    Medications, allergies and tobacco use reviewed.  Denies known Latex allergy or symptoms of Latex sensitivity.    REVIEW OF SYSTEMS:  CONSTITUTIONAL: no fever, weight changes, fatigue or drowsiness   HEENT: no dysphagia or vision changes  CARDIOVASCULAR: no chest pain, palpitations or syncope   RESPIRATORY: no cough, shortness of breath or wheezing   GI: no constipation, diarrhea, nausea, or vomiting   : no incontinence, urgency, or hesitancy  MUSCULOSKELETAL: as above  INTEGUMENTARY: no hypersensitivity, discoloration, or rash   NEURO: as above  ENDOCRINE: no temperature intolerance, polyuria, or polydipsia   HEME/LYMPH: no easy bruising, bleeding tendencies, lymphadenopathy   PSYCHIATRIC: no anxiety, depression or insomnia    Do you have an implanted glucose meter or insulin pump? NO    
1.5

## 2024-10-10 NOTE — DIETITIAN INITIAL EVALUATION ADULT - PERSON TAUGHT/METHOD
RD Discussed volumes of various cup sizes on tray table and encouraged aiming for 4 oz/hr as tolerated. Pt is prepared with protein shakes, with plan to get vitamins after discharge. RD provided in depth edu on diet advancement and specific nutrient needs s/p VSG. Pt appears receptive, verbalized understanding. Written nutrition education handouts provided./verbal instruction/written material/patient instructed

## 2024-10-10 NOTE — DIETITIAN INITIAL EVALUATION ADULT - PERTINENT MEDS FT
MEDICATIONS  (STANDING):  acetaminophen   Oral Liquid .. 1000 milliGRAM(s) Oral every 6 hours  dextrose 5% + sodium chloride 0.45%. 1000 milliLiter(s) (75 mL/Hr) IV Continuous <Continuous>  enoxaparin Injectable 40 milliGRAM(s) SubCutaneous every 24 hours  ketorolac   Injectable 15 milliGRAM(s) IV Push every 6 hours  pantoprazole  Injectable 40 milliGRAM(s) IV Push daily  thiamine IVPB 500 milliGRAM(s) IV Intermittent every 24 hours    MEDICATIONS  (PRN):  HYDROmorphone  Injectable 0.5 milliGRAM(s) IV Push every 15 minutes PRN severe breakthrough pain  ondansetron Injectable 4 milliGRAM(s) IV Push every 6 hours PRN Nausea and/or Vomiting  oxyCODONE    Solution 2.5 milliGRAM(s) Oral every 6 hours PRN Moderate Pain (4 - 6)  oxyCODONE    Solution 5 milliGRAM(s) Oral every 6 hours PRN Severe Pain (7 - 10)

## 2024-10-10 NOTE — PROGRESS NOTE ADULT - SUBJECTIVE AND OBJECTIVE BOX
INTERVAL HPI/OVERNIGHT EVENTS:  : hgb 12.7 (13.4), +n/-v, minimal PO,   10/9: RA lap VSG, poc wnl, pTOV    STATUS POST:  Robotic assisted sleeve gastrectomy      POST OPERATIVE DAY #: 1    SUBJECTIVE: Patient examined bedside with  chief resident. Patient complains of incisional pain. Patient reports that she is tolerating bariatric clear liquid diet 4oz/hr . Patient reports she is ambulating and using incentive spirometer. Patient denies SOB, chest pain, nausea and emesis. Patient making adequate urine output.       enoxaparin Injectable 40 milliGRAM(s) SubCutaneous every 24 hours      Vital Signs Last 24 Hrs  T(C): 36.9 (10 Oct 2024 12:33), Max: 37.1 (10 Oct 2024 08:35)  T(F): 98.5 (10 Oct 2024 12:33), Max: 98.8 (10 Oct 2024 08:35)  HR: 73 (10 Oct 2024 12:33) (73 - 97)  BP: 138/88 (10 Oct 2024 12:33) (93/63 - 150/80)  BP(mean): 87 (10 Oct 2024 05:55) (78 - 110)  RR: 18 (10 Oct 2024 12:33) (16 - 20)  SpO2: 96% (10 Oct 2024 12:33) (91% - 100%)    Parameters below as of 10 Oct 2024 12:33  Patient On (Oxygen Delivery Method): room air      I&O's Detail    09 Oct 2024 07:01  -  10 Oct 2024 07:00  --------------------------------------------------------  IN:    Lactated Ringers: 2240 mL  Total IN: 2240 mL    OUT:    Voided (mL): 1775 mL  Total OUT: 1775 mL    Total NET: 465 mL      10 Oct 2024 07:01  -  10 Oct 2024 13:18  --------------------------------------------------------  IN:    dextrose 5% + sodium chloride 0.45%: 225 mL    Lactated Ringers: 280 mL    Oral Fluid: 120 mL  Total IN: 625 mL    OUT:    Voided (mL): 975 mL  Total OUT: 975 mL    Total NET: -350 mL          General: NAD, resting comfortably in bed  C/V: NSR  Pulm: Nonlabored breathing, no respiratory distress  Abd: Soft, non-distended, TTP around incision site, incision clean dry and intact.   Extrem: WWP, no edema, SCDs in place      LABS:                        11.2   4.28  )-----------( 275      ( 10 Oct 2024 08:05 )             36.1     10-10    137  |  104  |  11  ----------------------------<  133[H]  4.7   |  25  |  0.80    Ca    9.1      10 Oct 2024 08:05  Phos  3.4     10-10  Mg     2.0     10-10        Urinalysis Basic - ( 10 Oct 2024 08:05 )    Color: x / Appearance: x / SG: x / pH: x  Gluc: 133 mg/dL / Ketone: x  / Bili: x / Urobili: x   Blood: x / Protein: x / Nitrite: x   Leuk Esterase: x / RBC: x / WBC x   Sq Epi: x / Non Sq Epi: x / Bacteria: x           INTERVAL HPI/OVERNIGHT EVENTS:  : hgb 12.7 (13.4), +n/-v, minimal PO,   10/9: RA lap VSG, poc wnl, pTOV    STATUS POST:  Robotic assisted sleeve gastrectomy      POST OPERATIVE DAY #: 1    SUBJECTIVE: Patient examined bedside with  chief resident. Patient complains of incisional pain. Patient reports that she is tolerating bariatric clear liquid diet 4oz/hr . Patient reports she is ambulating and using incentive spirometer. Patient denies SOB, chest pain,heart palpitations, dizziness, lightheadedness headache  nausea and emesis. Patient making adequate urine output.       enoxaparin Injectable 40 milliGRAM(s) SubCutaneous every 24 hours      Vital Signs Last 24 Hrs  T(C): 36.9 (10 Oct 2024 12:33), Max: 37.1 (10 Oct 2024 08:35)  T(F): 98.5 (10 Oct 2024 12:33), Max: 98.8 (10 Oct 2024 08:35)  HR: 73 (10 Oct 2024 12:33) (73 - 97)  BP: 138/88 (10 Oct 2024 12:33) (93/63 - 150/80)  BP(mean): 87 (10 Oct 2024 05:55) (78 - 110)  RR: 18 (10 Oct 2024 12:33) (16 - 20)  SpO2: 96% (10 Oct 2024 12:33) (91% - 100%)    Parameters below as of 10 Oct 2024 12:33  Patient On (Oxygen Delivery Method): room air      I&O's Detail    09 Oct 2024 07:01  -  10 Oct 2024 07:00  --------------------------------------------------------  IN:    Lactated Ringers: 2240 mL  Total IN: 2240 mL    OUT:    Voided (mL): 1775 mL  Total OUT: 1775 mL    Total NET: 465 mL      10 Oct 2024 07:01  -  10 Oct 2024 13:18  --------------------------------------------------------  IN:    dextrose 5% + sodium chloride 0.45%: 225 mL    Lactated Ringers: 280 mL    Oral Fluid: 120 mL  Total IN: 625 mL    OUT:    Voided (mL): 975 mL  Total OUT: 975 mL    Total NET: -350 mL          General: NAD, resting comfortably in bed  C/V: NSR  Pulm: Nonlabored breathing, no respiratory distress  Abd: Soft, non-distended, TTP around incision site, incision clean dry and intact.   Extrem: WWP, no edema, SCDs in place      LABS:                        11.2   4.28  )-----------( 275      ( 10 Oct 2024 08:05 )             36.1     10-10    137  |  104  |  11  ----------------------------<  133[H]  4.7   |  25  |  0.80    Ca    9.1      10 Oct 2024 08:05  Phos  3.4     10-10  Mg     2.0     10-10        Urinalysis Basic - ( 10 Oct 2024 08:05 )    Color: x / Appearance: x / SG: x / pH: x  Gluc: 133 mg/dL / Ketone: x  / Bili: x / Urobili: x   Blood: x / Protein: x / Nitrite: x   Leuk Esterase: x / RBC: x / WBC x   Sq Epi: x / Non Sq Epi: x / Bacteria: x

## 2024-10-10 NOTE — DISCHARGE NOTE PROVIDER - NS AS DC PROVIDER CONTACT Y/N MULTI
Detail Level: Simple
Comment: 2/28/24:\\nImprovement noted with NBUVB. Reports the last 2 weeks she has had 2 treatments because of scheduling conflicts and she is noting more itching. Will continue to shoot for 3x week if possible while we await her dupixent initiation. She reports she hasn’t reached out to PAP yet, but plans to do so this week. Recommended starting that process as soon as possible so we can try to wean off lights sooner. \\n\\n1/4/24:\\nReports itching has been significantly worse lately and not responding well to antihistamines. She had previous labs which revealed hypogammaglobulinemia and low Kappa. Was seen by heme/onc who did further testing which was all negative. Didn’t require any f/u, but she has appt scheduled this afternoon to be evaluated again since her itching symptoms have worsened again. Reports recent viral illness before all of her symptoms returned again this time which is similar to presentation in past. Is severely triggered by fragrance and fabrics, has been re-washing all clothes in free and clear detergents and using vanicream shampoo/conditioner which has helped reduce her itching. Skin rash is still very mild, barely visible. \\n\\nResponded well to lights, depending on heme/onc results this time around will determine if we consider systemic agents again. She will update me next week once she has more information from heme/onc. \\n\\n7/6/22:\\nImprovement with light box but flares when she misses a day. Discussed attempting to get approval for an at home unit, not interested at this time due to space constraints. She is clear and symptom free today and very happy with her treatment plan.  \\n\\n4/19/22\\nPruritic, likely 2/2 underlying atopic derm, pruritus is out of control but skin is visibly clear today \\nPatient stated areas are 50% better with TAC and 2 Xyzal per day \\nPer last note Dupixent and Rinvoq discussed in detail last time \\n\\nHas a history of very hypersensitive skin. Says she reacts to shampoos, was tested by allergist (pt is describing patch test - will get outside records). Says allergy testing was negative, but did in office real world patch test with shampoo and reacted strongly. \\n\\nPlan: \\nPatient is not allowed to have any vaccines due to hx of fibromyalgia - has not had shingles vaccine - wants to do UVB over Dupixent for this reason \\n\\Cherri age appropriate malignancy screening up to date per patient\\n\\n*Elects to start UVB instead of Dupixent due to slight shingles risk \\n*Get outside allergy records to see if patch testing done - if it was not done, could offer patch testing \\n*Continue TAC as needed, continue Xyzal at night, pt is also taking famotidine \\n*Labs to r/o systemic cause of pruritus
Render Risk Assessment In Note?: no
Yes

## 2024-10-10 NOTE — PROGRESS NOTE ADULT - ASSESSMENT
44 y/o F with PMHx of obesity (BMI 35), ARAGON, HLD, and PSHx of partial lumpectomy with sentinel lymph node dissection of left breast for breast CA (now in remission, on lestrazole),  and tubal ligation now s/p RA lap VSG (10/9)    BCLD/IVF  pain/nausea control  thiamine   protonix  OOBA/SCD/IS  Lovenox   AM labs  Nutrition consult 42 y/o F with PMHx of obesity (BMI 35), ARAGON, HLD, and PSHx of partial lumpectomy with sentinel lymph node dissection of left breast for breast CA (now in remission, on letrozole) ,  and tubal ligation now s/p RA lap VSG (10/9). Patient H/H  dropped from  12.7/40.0 to 11.2 /36.1 and is hemodynamically stable. Patient will be discharged to family today.     BCLD/IVF  pain/nausea control  thiamine   protonix  OOBA/SCD/IS  Lovenox   AM labs  Nutrition consult

## 2024-10-10 NOTE — DIETITIAN INITIAL EVALUATION ADULT - PERTINENT LABORATORY DATA
10-10    137  |  104  |  11  ----------------------------<  133[H]  4.7   |  25  |  0.80    Ca    9.1      10 Oct 2024 08:05  Phos  3.4     10-10  Mg     2.0     10-10    POCT Blood Glucose.: 103 mg/dL (10-09-24 @ 14:12)

## 2024-10-10 NOTE — DISCHARGE NOTE NURSING/CASE MANAGEMENT/SOCIAL WORK - PATIENT PORTAL LINK FT
You can access the FollowMyHealth Patient Portal offered by [facility] by registering at the following website: [email or web address]. By joining [facility]’s FollowMyHealth portal, you will also be able to view your health information using other applications (apps) compatible with our system.

## 2024-10-10 NOTE — DISCHARGE NOTE PROVIDER - HOSPITAL COURSE
42 y/o F with PMHx of obesity (BMI 35), ARAGON, HLD, and PSHx of partial lumpectomy with sentinel lymph node dissection of left breast for breast CA (now in remission, on letrozole ,  and tubal ligation now s/p RA lap VSG (10/9). Pt tolerated the procedure well. At time of discharge, pt was tolerating a bariatric clear liquid diet, and pt's pain was controlled. Plan is to follow up with Dr. Gaxiola in the office.

## 2024-10-10 NOTE — DISCHARGE NOTE PROVIDER - NSDCFUADDINST_GEN_ALL_CORE_FT
Follow up with Dr. Gaxiola  in 1 week. Call the office at  to schedule your appointment. You may shower; soap and water over incision sites. Do not scrub. Pat dry when done. No tub bathing or swimming until cleared. Keep incision sites out of the sun as scars will darken. No heavy lifting (>10lbs) or strenuous exercise. Diet: Bariatric Full Fluids. 60 grams protein daily.  64 fluid ounces water daily. Drink small sips throughout the day. Continue diet as outlined by paperwork received as a pre-operative patient. You should be urinating at least 3-4x per day. Call the office if you experience increasing abdominal pain, nausea, vomiting, or temperature >100.4F.  NO ASPIRIN OR NSAIDs until approved by Dr. Gaxiola. Avoid alcoholic beverages until cleared by Dr. Gaxiola.     1) Please take Tylenol 650 mg every 4 to 6 hours by mouth for moderate pain control. Please do not exceed over 4,000 mg of Tylenol a day. 2) Please take Omeprazole 40 mg once a day by mouth. 3) Please take [id number] mg every 6 hours.  4) Please take Zofran 4 mg every 6 hours as needed for nausea and or vomiting.

## 2024-10-10 NOTE — DIETITIAN INITIAL EVALUATION ADULT - OTHER CALCULATIONS
IBW 97.5 pounds, %%  Above energy needs calculated for wt maintenance (20-25kcal/kg, 1.2-1.5g protein/kg).   Weeks 1-2 estimated needs: 443-531kcal/day (10-12kcal/kg), 60-80g protein/day, >/=64oz clear fluids.

## 2024-10-10 NOTE — DIETITIAN INITIAL EVALUATION ADULT - OTHER INFO
44 y/o F with PMHx of pre-DM, ARAGON, hypercholesterolemia, GERD, and PSHx of partial lumpectomy with sentinel lymph node dissection of left breast for breast CA, csection, and tubal ligation who presents today for VSG.     Pt seen on 9UR at bedside. On assessment, pt resting in bed. Currently on Bariatric Clear Liquids (BARICLLIQ) diet, tolerating PO. Pt had sips of water out of the clear, 30cc cups. Pain and nausea well controlled. Discussed volumes of various cup sizes on tray table and encouraged aiming for 4 oz/hr as tolerated. Prepared with protein shakes, with plan to get vitamins after discharge. RD provided in-depth education on diet advancement and specific nutrient needs status-post VSG. No known food allergies. No dietary restrictions at home. Skin: surgical incisions. GI: WDL per flowsheet. Labs reviewed: ; will monitor trends. Pt's wt on admission was 170 pounds, pt's ideal body weight is 97.5 pounds; ideal body weight to be utilized for nutrient calculations. RD observed pt with no overt signs of muscle or fat wasting. Based on ASPEN guidelines, pt does not meet criteria for malnutrition at this time. RD to continue to follow up.  44 y/o F with PMHx of pre-DM, ARAGON, hypercholesterolemia, GERD, and PSHx of partial lumpectomy with sentinel lymph node dissection of left breast for breast CA, csection, and tubal ligation who presents today for VSG.     Pt seen on 9UR at bedside. On assessment, pt resting in bed. Currently on Bariatric Clear Liquids (BARICLLIQ) diet, tolerating PO. Pt had sips of water out of the clear, 30cc cups. Pain and nausea well controlled. Discussed volumes of various cup sizes on tray table and encouraged aiming for 4 oz/hr as tolerated. Prepared with protein shakes, with plan to get vitamins after discharge. RD provided in-depth education on diet advancement and specific nutrient needs status-post VSG. No known food allergies. No dietary restrictions at home. Skin: surgical incision to abdomen. GI: WDL per flowsheet. Labs reviewed: glucose 133<H>; will monitor trends. Pt's wt on admission was 170 pounds, pt's ideal body weight is 97.5 pounds; ideal body weight to be utilized for nutrient calculations. RD observed pt with no overt signs of muscle or fat wasting. Based on ASPEN guidelines, pt does not meet criteria for malnutrition at this time. RD to continue to follow up.

## 2024-10-10 NOTE — DISCHARGE NOTE NURSING/CASE MANAGEMENT/SOCIAL WORK - NSDCPEFALRISK_GEN_ALL_CORE
For information on Fall & Injury Prevention, visit: [email or web address] OR  [email or web address] OR  https://www.cdc.gov/steadi/patient.html

## 2024-10-10 NOTE — DISCHARGE NOTE PROVIDER - NSDCMRMEDTOKEN_GEN_ALL_CORE_FT
acetaminophen 160 mg/5 mL oral liquid: 20 milliliter(s) orally every 6 hours as needed for -for moderate to severe pain MDD: 80 mL  Eliquis 2.5 mg oral tablet: 1 tab(s) orally 2 times a day MDD:2 tablets  letrozole 2.5 mg oral tablet: 1 tab(s) orally once a day  Levsin SL 0.125 mg sublingual tablet: 1 tab(s) sublingually every 6 hours MDD: 4 TABS  omeprazole 40 mg oral delayed release capsule: 1 cap(s) orally once a day MDD: 1 cap  ondansetron 4 mg oral tablet, disintegratin tab(s) orally every 8 hours MDD: 3 tabs   acetaminophen 160 mg/5 mL oral liquid: 20 milliliter(s) orally every 6 hours as needed for -for moderate to severe pain MDD: 80 mL  letrozole 2.5 mg oral tablet: 1 tab(s) orally once a day  Levsin SL 0.125 mg sublingual tablet: 1 tab(s) sublingually every 6 hours MDD: 4 TABS  omeprazole 40 mg oral delayed release capsule: 1 cap(s) orally once a day MDD: 1 cap  ondansetron 4 mg oral tablet, disintegratin tab(s) orally every 8 hours MDD: 3 tabs

## 2024-10-10 NOTE — DISCHARGE NOTE PROVIDER - NSDCFUSCHEDAPPT_GEN_ALL_CORE_FT
Jerald Gaxiola  [facility]  [facility] [address]  Scheduled Appointment: 10/24/2024

## 2024-10-10 NOTE — DISCHARGE NOTE PROVIDER - CARE PROVIDER_API CALL
Jerald Gaxiola  Surgery  [address] [place] [id number]  Phone: [phone]  Fax: [phone]  Scheduled Appointment: 10/31/2024

## 2024-10-10 NOTE — DISCHARGE NOTE PROVIDER - NSDCCPCAREPLAN_GEN_ALL_CORE_FT
PRINCIPAL DISCHARGE DIAGNOSIS  Diagnosis: Morbid obesity  Assessment and Plan of Treatment: 44 y/o F with PMHx of obesity (BMI 35), ARAGON, HLD, and PSHx of partial lumpectomy with sentinel lymph node dissection of left breast for breast CA (now in remission, on letrozole ,  and tubal ligation now s/p RA lap VSG (10/9). Pt tolerated the procedure well. At time of discharge, pt was tolerating a bariatric clear liquid diet, and pt's pain was controlled. Plan is to follow up with Dr. Gaxiola in the office.  1) Please take Tylenol 650 mg every 4 to 6 hours by mouth for moderate pain control. Please do not exceed over 4,000 mg of Tylenol a day.  2) Please start taking Eliquis 2.5 mg by mouth twice a day starting 3 days after surgery. Please start 2024 .  3) Please take Omeprazole 40 mg once a day by mouth.  4) Please take [id number] mg every 6 hours.   5) Please take Zofran 4 mg every 6 hours as needed for nausea and or vomiting.     PRINCIPAL DISCHARGE DIAGNOSIS  Diagnosis: Morbid obesity  Assessment and Plan of Treatment: 44 y/o F with PMHx of obesity (BMI 35), ARAGON, HLD, and PSHx of partial lumpectomy with sentinel lymph node dissection of left breast for breast CA (now in remission, on letrozole ,  and tubal ligation now s/p RA lap VSG (10/9). Pt tolerated the procedure well. At time of discharge, pt was tolerating a bariatric clear liquid diet, and pt's pain was controlled. Plan is to follow up with Dr. Gaxiola in the office.  1) Please take Tylenol 650 mg every 4 to 6 hours by mouth for moderate pain control. Please do not exceed over 4,000 mg of Tylenol a day.  2) Please take Omeprazole 40 mg once a day by mouth.  3) Please take [id number] mg every 6 hours.   4) Please take Zofran 4 mg every 6 hours as needed for nausea and or vomiting.

## 2024-10-11 ENCOUNTER — NON-APPOINTMENT (OUTPATIENT)
Age: 44
End: 2024-10-11

## 2024-10-16 LAB — SURGICAL PATHOLOGY STUDY: SIGNIFICANT CHANGE UP

## 2024-10-17 ENCOUNTER — APPOINTMENT (OUTPATIENT)
Dept: BARIATRICS | Facility: CLINIC | Age: 44
End: 2024-10-17

## 2024-10-17 VITALS
BODY MASS INDEX: 33.17 KG/M2 | OXYGEN SATURATION: 99 % | DIASTOLIC BLOOD PRESSURE: 83 MMHG | HEART RATE: 85 BPM | TEMPERATURE: 97.6 F | HEIGHT: 58 IN | WEIGHT: 158 LBS | SYSTOLIC BLOOD PRESSURE: 145 MMHG

## 2024-10-17 DIAGNOSIS — Z98.84 BARIATRIC SURGERY STATUS: ICD-10-CM

## 2024-10-17 PROCEDURE — 99024 POSTOP FOLLOW-UP VISIT: CPT

## 2024-10-17 RX ORDER — CALCIUM CITRATE/VITAMIN D3 200MG-6.25
200-6.25 TABLET ORAL
Qty: 60 | Refills: 5 | Status: ACTIVE | COMMUNITY
Start: 2024-10-17 | End: 1900-01-01

## 2024-10-17 RX ORDER — MULTIVITAMIN/IRON/FOLIC ACID 18MG-0.4MG
TABLET ORAL DAILY
Qty: 30 | Refills: 5 | Status: ACTIVE | COMMUNITY
Start: 2024-10-17 | End: 1900-01-01

## 2024-10-18 DIAGNOSIS — E66.01 MORBID (SEVERE) OBESITY DUE TO EXCESS CALORIES: ICD-10-CM

## 2024-10-18 DIAGNOSIS — K75.81 NONALCOHOLIC STEATOHEPATITIS (NASH): ICD-10-CM

## 2024-10-18 DIAGNOSIS — Z71.3 DIETARY COUNSELING AND SURVEILLANCE: ICD-10-CM

## 2024-10-18 DIAGNOSIS — K21.9 GASTRO-ESOPHAGEAL REFLUX DISEASE WITHOUT ESOPHAGITIS: ICD-10-CM

## 2024-10-18 DIAGNOSIS — Z79.811 LONG TERM (CURRENT) USE OF AROMATASE INHIBITORS: ICD-10-CM

## 2024-10-18 DIAGNOSIS — R73.03 PREDIABETES: ICD-10-CM

## 2024-10-18 DIAGNOSIS — E78.00 PURE HYPERCHOLESTEROLEMIA, UNSPECIFIED: ICD-10-CM

## 2024-10-18 DIAGNOSIS — Z85.3 PERSONAL HISTORY OF MALIGNANT NEOPLASM OF BREAST: ICD-10-CM

## 2024-11-07 ENCOUNTER — APPOINTMENT (OUTPATIENT)
Dept: BARIATRICS | Facility: CLINIC | Age: 44
End: 2024-11-07
Payer: COMMERCIAL

## 2024-11-07 VITALS
TEMPERATURE: 97.3 F | DIASTOLIC BLOOD PRESSURE: 85 MMHG | HEIGHT: 59 IN | BODY MASS INDEX: 30.64 KG/M2 | HEART RATE: 86 BPM | OXYGEN SATURATION: 98 % | WEIGHT: 152 LBS | SYSTOLIC BLOOD PRESSURE: 122 MMHG

## 2024-11-07 DIAGNOSIS — Z98.84 BARIATRIC SURGERY STATUS: ICD-10-CM

## 2024-11-07 PROCEDURE — 99024 POSTOP FOLLOW-UP VISIT: CPT

## 2024-11-07 RX ORDER — OMEPRAZOLE 40 MG/1
40 CAPSULE, DELAYED RELEASE ORAL
Qty: 30 | Refills: 0 | Status: ACTIVE | COMMUNITY
Start: 2024-11-07 | End: 1900-01-01

## 2024-12-12 ENCOUNTER — APPOINTMENT (OUTPATIENT)
Dept: BARIATRICS | Facility: CLINIC | Age: 44
End: 2024-12-12
Payer: COMMERCIAL

## 2024-12-12 VITALS
TEMPERATURE: 98.1 F | DIASTOLIC BLOOD PRESSURE: 86 MMHG | OXYGEN SATURATION: 97 % | SYSTOLIC BLOOD PRESSURE: 124 MMHG | BODY MASS INDEX: 29.23 KG/M2 | HEIGHT: 59 IN | HEART RATE: 79 BPM | WEIGHT: 145 LBS

## 2024-12-12 DIAGNOSIS — Z98.84 BARIATRIC SURGERY STATUS: ICD-10-CM

## 2024-12-12 PROCEDURE — 99024 POSTOP FOLLOW-UP VISIT: CPT

## 2024-12-12 RX ORDER — OMEPRAZOLE 40 MG/1
40 CAPSULE, DELAYED RELEASE ORAL
Qty: 30 | Refills: 0 | Status: ACTIVE | COMMUNITY
Start: 2024-12-12 | End: 1900-01-01

## 2025-02-13 ENCOUNTER — APPOINTMENT (OUTPATIENT)
Dept: BARIATRICS | Facility: CLINIC | Age: 45
End: 2025-02-13

## 2025-02-20 ENCOUNTER — APPOINTMENT (OUTPATIENT)
Dept: BARIATRICS | Facility: CLINIC | Age: 45
End: 2025-02-20

## 2025-06-12 RX ORDER — OMEPRAZOLE 40 MG/1
40 CAPSULE, DELAYED RELEASE ORAL
Qty: 30 | Refills: 1 | Status: ACTIVE | COMMUNITY
Start: 2025-06-12 | End: 1900-01-01

## 2025-06-20 ENCOUNTER — APPOINTMENT (OUTPATIENT)
Dept: BARIATRICS | Facility: CLINIC | Age: 45
End: 2025-06-20

## (undated) DEVICE — BLADE SCALPEL SAFETYLOCK #15

## (undated) DEVICE — SUT STRATAFIX SPIRAL PDS PLUS 2-0 30CM SH

## (undated) DEVICE — XI ENDOWRIST 12 - 8 MM CANNULA REDUCER

## (undated) DEVICE — XI OBTURATOR OPTICAL BLADELESS 8MM

## (undated) DEVICE — PACK GENERAL LAPAROSCOPY

## (undated) DEVICE — DRSG DERMABOND 0.7ML

## (undated) DEVICE — D HELP - CLEARVIEW CLEARIFY SYSTEM

## (undated) DEVICE — TROCAR SURGIQUEST AIRSEAL 5MM X 100MM

## (undated) DEVICE — SUT VICRYL PLUS 0 54" TIES UNDYED

## (undated) DEVICE — XI DRAPE ARM

## (undated) DEVICE — MARKING PEN W RULER

## (undated) DEVICE — XI VESSEL SEALER

## (undated) DEVICE — XI STAPLER SUREFORM 60

## (undated) DEVICE — XI 12MM AND STAPLER CANNULA SEAL

## (undated) DEVICE — XI DRAPE COLUMN

## (undated) DEVICE — Device

## (undated) DEVICE — SUT MONOCRYL 4-0 27" PS-2 UNDYED

## (undated) DEVICE — ELCTR BOVIE PENCIL BLADE 10FT

## (undated) DEVICE — XI SEAL UNIVERSIAL 5-12MM

## (undated) DEVICE — TUBING AIRSEAL TRI-LUMEN FILTERED

## (undated) DEVICE — SUT ETHIBOND EXCEL 2-0 36" SH

## (undated) DEVICE — SUT VICRYL 3-0 27" SH

## (undated) DEVICE — INSUFFLATION NDL COVIDIEN SURGINEEDLE VERESS 150MM LONG

## (undated) DEVICE — VENODYNE/SCD SLEEVE CALF MEDIUM